# Patient Record
(demographics unavailable — no encounter records)

---

## 2025-04-01 NOTE — PHYSICAL EXAM
[Right] : right foot and ankle [Severe] : severe diffused ankle swelling [] : Sensation present to light touch in all distributions [FreeTextEntry3] : lymphedema lower extremity with venous stasis changes [de-identified] : marked lymphedema [TWNoteComboBox7] : dorsiflexion 15 degrees [de-identified] : plantar flexion 30 degrees [de-identified] : inversion 10 degrees [de-identified] : eversion 25 degrees

## 2025-04-01 NOTE — IMAGING
[Degenerative change] : Degenerative change [Right] : right foot [FreeTextEntry9] : ankle and tn spur [de-identified] : CC spur

## 2025-04-01 NOTE — DISCUSSION/SUMMARY
[de-identified] :  The patient requires an AFO for stabilization at the ankle and has the potential to benefit functionally and to return to ADL's safely.  The patient cannot be fitted with a prefabricated brace due to the ankle deformities and the shape of the current limb.  The patient requires stabilization in multiple planes due to the deformities present.  The condition necessitating the orthosis is expected to be of longstanding duration. Lining indicated due to at-risk fragile skin.  This custom molded AFO will allow the patient to return to the level of independence they are accustomed to and be custom fitted for total contact.

## 2025-06-03 NOTE — DISCUSSION/SUMMARY
[de-identified] : The patient requires an AFO for stabilization at the ankle and has the potential to benefit functionally and to return to ADL's safely.  The patient cannot be fitted with a prefabricated brace due to the ankle deformities and the shape of the current limb.  The patient requires stabilization in multiple planes due to the deformities present.  The condition necessitating the orthosis is expected to be of longstanding duration. Lining indicated due to at-risk fragile skin.  This custom molded AFO will allow the patient to return to the level of independence they are accustomed to and be custom fitted for total contact.   Lymhedema Referral Accomodative shoe

## 2025-06-03 NOTE — PHYSICAL EXAM
[Right] : right foot and ankle [Severe] : severe diffused ankle swelling [] : no calf tenderness [FreeTextEntry3] : lymphedema lower extremity with venous stasis changes [de-identified] : marked lymphedema [TWNoteComboBox7] : dorsiflexion 15 degrees [de-identified] : plantar flexion 30 degrees [de-identified] : inversion 10 degrees [de-identified] : eversion 25 degrees

## 2025-06-03 NOTE — HISTORY OF PRESENT ILLNESS
[de-identified] : 04/01/2025: ASIYA MELARA is here for evaluation of her right ankle pain. Pain started after no injury 11.15.24 she noticed the pain in the foot she felt aching pain and stiffness. Denies any prior injury or trauma. Pain is radiating from the lateral ankle and heel. WB in Shoes. Hx lymphedema and using compression stockings.  06.03.25: Patient is following up on the R ankle pain. Pain is improving through physical therapy. Pt 2x weekly.  Using compression stockings.  [FreeTextEntry1] : right ankle pain

## 2025-06-03 NOTE — IMAGING
[Degenerative change] : Degenerative change [Right] : right foot [FreeTextEntry9] : ankle and tn spur [de-identified] : CC spur